# Patient Record
Sex: MALE | Race: WHITE | URBAN - METROPOLITAN AREA
[De-identification: names, ages, dates, MRNs, and addresses within clinical notes are randomized per-mention and may not be internally consistent; named-entity substitution may affect disease eponyms.]

---

## 2021-11-10 ENCOUNTER — TELEPHONE (OUTPATIENT)
Dept: ALLERGY | Facility: CLINIC | Age: 51
End: 2021-11-10

## 2021-11-10 NOTE — TELEPHONE ENCOUNTER
Patient is calling to request a new patient consult appt. Dr. Barboza [place] 1st available is 12/23/21. Are you able to see new patient this week or next. Patient state he believes he is now allergic to his dog.

## 2021-11-11 NOTE — TELEPHONE ENCOUNTER
Spoke with patient. Verified patient's name and . Offered patient next available consult appointment for 21 at 1 pm. Patient accepted appointment.    Informed patient if he wishes to have skin testing will need to be off allergy medication 5 days p